# Patient Record
Sex: FEMALE | Race: ASIAN | NOT HISPANIC OR LATINO | Employment: UNEMPLOYED | URBAN - METROPOLITAN AREA
[De-identification: names, ages, dates, MRNs, and addresses within clinical notes are randomized per-mention and may not be internally consistent; named-entity substitution may affect disease eponyms.]

---

## 2023-10-12 ENCOUNTER — OFFICE VISIT (OUTPATIENT)
Dept: FAMILY MEDICINE | Facility: CLINIC | Age: 38
End: 2023-10-12

## 2023-10-12 VITALS
DIASTOLIC BLOOD PRESSURE: 92 MMHG | RESPIRATION RATE: 12 BRPM | OXYGEN SATURATION: 99 % | HEIGHT: 60 IN | TEMPERATURE: 98 F | HEART RATE: 87 BPM | WEIGHT: 125.75 LBS | SYSTOLIC BLOOD PRESSURE: 124 MMHG | BODY MASS INDEX: 24.69 KG/M2

## 2023-10-12 DIAGNOSIS — E05.90 HYPERTHYROIDISM: ICD-10-CM

## 2023-10-12 DIAGNOSIS — I10 PRIMARY HYPERTENSION: ICD-10-CM

## 2023-10-12 DIAGNOSIS — Z30.09 BIRTH CONTROL COUNSELING: Primary | ICD-10-CM

## 2023-10-12 DIAGNOSIS — Z30.017 INSERTION OF IMPLANTABLE SUBDERMAL CONTRACEPTIVE: ICD-10-CM

## 2023-10-12 DIAGNOSIS — E78.5 HYPERLIPIDEMIA LDL GOAL <100: ICD-10-CM

## 2023-10-12 LAB — HCG UR QL: NEGATIVE

## 2023-10-12 PROCEDURE — 81025 URINE PREGNANCY TEST: CPT | Performed by: FAMILY MEDICINE

## 2023-10-12 PROCEDURE — 99204 OFFICE O/P NEW MOD 45 MIN: CPT | Mod: 25 | Performed by: FAMILY MEDICINE

## 2023-10-12 PROCEDURE — 11981 INSERTION DRUG DLVR IMPLANT: CPT | Performed by: FAMILY MEDICINE

## 2023-10-12 RX ORDER — METHIMAZOLE 5 MG/1
5 TABLET ORAL DAILY
Qty: 90 TABLET | Refills: 3 | Status: SHIPPED | OUTPATIENT
Start: 2023-10-12

## 2023-10-12 RX ORDER — ATORVASTATIN CALCIUM 20 MG/1
20 TABLET, FILM COATED ORAL DAILY
Qty: 90 TABLET | Refills: 3 | Status: SHIPPED | OUTPATIENT
Start: 2023-10-12

## 2023-10-12 RX ORDER — AMLODIPINE BESYLATE 5 MG/1
5 TABLET ORAL DAILY
Qty: 90 TABLET | Refills: 3 | Status: SHIPPED | OUTPATIENT
Start: 2023-10-12

## 2023-10-12 RX ORDER — METHIMAZOLE 5 MG/1
5 TABLET ORAL
COMMUNITY
Start: 2023-10-12 | End: 2023-10-12

## 2023-10-12 RX ORDER — AMLODIPINE BESYLATE 5 MG/1
5 TABLET ORAL DAILY
Qty: 90 TABLET | Refills: 0 | Status: SHIPPED | OUTPATIENT
Start: 2023-10-12 | End: 2023-10-12

## 2023-10-12 NOTE — COMMUNITY RESOURCES LIST (ENGLISH)
10/12/2023   [facility]  N/A  For questions about this resource list or additional care needs, please contact your primary care clinic or care manager.  Phone: [phone]   Email: N/A   Address: [address] [place] [address]   Hours: N/A        Financial Stability       Rent and mortgage payment assistance  1  [facility] Distance: 1.29 miles      Phone/Virtual   [address] [place] [address]  Language: English  Hours: Mon - Fri 9:00 AM - 4:00 PM  Fees: Free   Phone: (385) 217-2454 Email: [email or web address] Website: [email or web address]     2  [employer] (HAP) [facility] - Supportive Housing Assistance Program (SHAP) Distance: 1.32 miles      Phone/Virtual   [address] [place] [address]  Language: English, Hmong, Ирина, Macanese  Hours: Mon - Fri 8:30 AM - 5:00 PM  Fees: Free   Phone: (570) 936-5175 Email: lana@[email or web address].org Website: http://www.hmong.org/hap-impact-areas/          Important Numbers & Websites       Emergency Services   911  [facility] Services   311  Poison Control   (343) 706-3259  Suicide Prevention Lifeline   (201) 884-9815 (TALK)  Child Abuse Hotline   (591) 623-7520 (4-A-Child)  Sexual Assault Hotline   (120) 551-8100 (HOPE)  National Runaway Safeline   (817) 507-8221 (RUNAWAY)  All-Options Talkline   (743) 382-9082  Substance Abuse Referral   (128) 506-4833 (HELP)

## 2023-10-12 NOTE — PROGRESS NOTES
Assessment & Plan     Birth control counseling  Long discussion about cost, she currently does not have insurance because [place]. Discussed options like planned parenthood, injections, ocps which are more affordable options. She expressed understanding of out of pocket cost. Also discussed with assistance from [facility]. Risk vs benefit discussed and they opted to get nexplanon done for long term contraception.   - HCG qualitative urine    Primary hypertension  Refilled. DBP elevated, patient has not had her medications for over a day.   - amLODIPine (NORVASC) 5 MG tablet  Dispense: 90 tablet; Refill: 3    Hyperthyroidism  Patient came from [facility], currently on carbimazole (carbimazole metabolizes to methimazole, recommend patient have her TSH checked after changing medications, she declined due to insurance)  - methimazole (TAPAZOLE) 5 MG tablet  Dispense: 90 tablet; Refill: 3    Hyperlipidemia LDL goal <100  - atorvastatin (LIPITOR) 20 MG tablet  Dispense: 90 tablet; Refill: 3    Insertion of implantable subdermal contraceptive  Nexplanon Insertion:  Is a pregnancy test required: Yes.  Was it positive or negative?  Negative  Was a consent obtained?  Yes  Given 's handouts, including when to have Nexplanon removed, list of danger s/sx, side effects and follow up recommended. Encouraged condom use for prevention of STD. Back up contraception advised for 7 days. Advised to call for any fever, for prolonged or severe pain or bleeding, abnormal vaginal dischage. She was advised to use pain medications (ibuprofen) as needed for mild to moderate pain.   - etonogestrel (NEXPLANON) subdermal implant 68 mg  - INSERTION NON-BIODEGRADABLE DRUG DELIVERY IMPLANT        Juan Antonio Washington MD  [facility] JE Aly is a 37 year old, presenting for the following health issues:  Contraception      History of Present Illness       Reason for visit:  Birth  "control    She eats 0-1 servings of fruits and vegetables daily.She consumes 1 sweetened beverage(s) daily.She exercises with enough effort to increase her heart rate 9 or less minutes per day.  She exercises with enough effort to increase her heart rate 3 or less days per week.   She is taking medications regularly.     Patient takes amlodipine, unsure what dose. Took it yesterday but not tday.   She is also on carbimazole for her thyroid, but unsure what dose.     Birth control:   Interested in something long term. Discussed options:   Declined IUD.   Interested in nexplanon, patch, and nuvaring depending on cost.   She would be open to depo as well.       Sheeba Otero is a 37 year old No obstetric history on file. presents for Nexplanon.  Patient has been given the opportunity to ask questions about all forms of birth control, including all options appropriate for Sheeba Otero. Discussed that no method of birth control, except abstinence is 100% effective against pregnancy or sexually transmitted infection.   Sheeba Otero understands she may have the Nexplanon removed at any time and it should be removed by a health care provider.  The entire insertion procedure was reviewed with the patient, including care after placement.    Review of Systems   Constitutional, HEENT, cardiovascular, pulmonary, gi and gu systems are negative, except as otherwise noted.      Objective    BP (!) 124/92   Pulse 87   Temp 98  F (36.7  C) (Oral)   Resp 12   Ht 1.535 m (5' 0.43\")   Wt 57 kg (125 lb 12 oz)   LMP 10/01/2023 (Exact Date)   SpO2 99%   Breastfeeding No   BMI 24.21 kg/m    Body mass index is 24.21 kg/m .  Physical Exam   GENERAL: healthy, alert and no distress  NECK: no adenopathy, no asymmetry, masses, or scars and thyroid normal to palpation  RESP: lungs clear to auscultation - no rales, rhonchi or wheezes  CV: regular rate and rhythm, normal S1 S2, no S3 or S4, no murmur, click or rub, no peripheral " edema and peripheral pulses strong  ABDOMEN: soft, nontender, no hepatosplenomegaly, no masses and bowel sounds normal  MS: no gross musculoskeletal defects noted, no edema    Results for orders placed or performed in visit on 10/12/23   HCG qualitative urine     Status: Normal   Result Value Ref Range    hCG Urine Qualitative Negative Negative     No results found for this or any previous visit (from the past 24 hour(s)).              PROCEDURE NOTE: -- Nexplanon Insertion    Reason for Insertion: contraception    Patient was placed supine with left arm exposed.  Jemal was made 8-10 cm above medial epicondyle and a guiding jemal 4 cm above the first.  Arm was prepped with Betadine. Insertion point was anesthetized with 5 mL 1% lidocaine. After stretching the skin with thumb and index finger around the insertion site, skin punctured with the tip of the needle inserted at 30 degrees and then lowered to horizontal position. The needle was then advanced to its full length. Applicator was then stabilized and slider was unlocked. Slider was pulled back until it stopped and then removed.    Correct placement of the implant was confirmed by palpation in the patient's arm and visualizing the purple top of the obturator.   Bandage and pressure dressing applied to insertion site.    EBL: minimal    Complications: none

## 2023-10-12 NOTE — COMMUNITY RESOURCES LIST (ENGLISH)
10/12/2023   [facility]  N/A  For additional resource needs, please contact your health insurance member services or your primary care team.  Phone: [phone]   Email: N/A   Address: [address] [place] [address]   Hours: N/A        Financial Stability       Rent and mortgage payment assistance  1  [facility] Distance: 1.29 miles      Phone/Virtual   [address] [place] [address]  Language: English  Hours: Mon - Fri 9:00 AM - 4:00 PM  Fees: Free   Phone: (529) 669-8833 Email: [email or web address] Website: [email or web address]/[facility]/     2  [employer] (HAP) [facility] - Supportive Housing Assistance Program (SHAP) Distance: 1.32 miles      Phone/Virtual   [address] [place] [address]  Language: English, Hmong, Mirtha Gifford  Hours: Mon - Fri 8:30 AM - 5:00 PM  Fees: Free   Phone: (629) 529-3757 Email: lana@[email or web address] Website: http://www.ong.org/hap-impact-areas/          Important Numbers & Websites       [address].org  Poison Control   (181) 978-1545 [place]  Suicide and Crisis Lifeline   988 98[facility].org  Childhelp [place] Child Abuse Hotline   809.665.7144 Childhelphotline.org  National Sexual Assault Hotline   (871) 812-1718 (HOPE) Rainn.org  National Runaway Safeline   (757) 229-9437 (RUNAWAY) [facility].org  Pregnancy & Postpartum Support [place]   Call/text 777-322-2820 Ppsupportmn.org  Substance Abuse National Helpline ([facility]   169-395-HELP (8192) Findtreatment.gov  Emergency Services   911

## 2024-12-11 ENCOUNTER — OFFICE VISIT (OUTPATIENT)
Dept: URGENT CARE | Facility: URGENT CARE | Age: 39
End: 2024-12-11

## 2024-12-11 VITALS
RESPIRATION RATE: 20 BRPM | HEART RATE: 65 BPM | OXYGEN SATURATION: 100 % | DIASTOLIC BLOOD PRESSURE: 91 MMHG | SYSTOLIC BLOOD PRESSURE: 130 MMHG | TEMPERATURE: 98 F

## 2024-12-11 DIAGNOSIS — B34.9 VIRAL SYNDROME: Primary | ICD-10-CM

## 2024-12-11 DIAGNOSIS — Z11.52 ENCOUNTER FOR SCREENING FOR COVID-19: ICD-10-CM

## 2024-12-11 DIAGNOSIS — R05.1 ACUTE COUGH: ICD-10-CM

## 2024-12-11 LAB
DEPRECATED S PYO AG THROAT QL EIA: NEGATIVE
FLUAV AG SPEC QL IA: NEGATIVE
FLUBV AG SPEC QL IA: NEGATIVE
GROUP A STREP BY PCR: NOT DETECTED
SARS-COV-2 RNA RESP QL NAA+PROBE: NEGATIVE

## 2024-12-11 PROCEDURE — 87651 STREP A DNA AMP PROBE: CPT | Performed by: PHYSICIAN ASSISTANT

## 2024-12-11 PROCEDURE — 87804 INFLUENZA ASSAY W/OPTIC: CPT | Performed by: PHYSICIAN ASSISTANT

## 2024-12-11 PROCEDURE — 87635 SARS-COV-2 COVID-19 AMP PRB: CPT | Performed by: PHYSICIAN ASSISTANT

## 2024-12-11 PROCEDURE — 99214 OFFICE O/P EST MOD 30 MIN: CPT | Performed by: PHYSICIAN ASSISTANT

## 2024-12-11 RX ORDER — ALBUTEROL SULFATE 90 UG/1
2 INHALANT RESPIRATORY (INHALATION) EVERY 6 HOURS
Qty: 18 G | Refills: 0 | Status: SHIPPED | OUTPATIENT
Start: 2024-12-11 | End: 2025-01-10

## 2024-12-11 RX ORDER — BENZONATATE 100 MG/1
100 CAPSULE ORAL 3 TIMES DAILY PRN
Qty: 30 CAPSULE | Refills: 0 | Status: SHIPPED | OUTPATIENT
Start: 2024-12-11 | End: 2024-12-21

## 2024-12-11 NOTE — PROGRESS NOTES
Patient presents with:  Cough: Cough X1 mouth   Itchy throat       Clinical Decision Making:  Strep test was obtained and was negative.  Culture is to follow. Influenza is negative. COVID-19 screening test is pending.  Symptomatic care was gone over. Expected course of resolution and indication for return was gone over and questions were answered to patient/parent's satisfaction before discharge.        ICD-10-CM    1. Viral syndrome  B34.9 Streptococcus A Rapid Screen w/Reflex to PCR     Influenza A & B Antigen     Group A Streptococcus PCR Throat Swab     albuterol (PROAIR HFA/PROVENTIL HFA/VENTOLIN HFA) 108 (90 Base) MCG/ACT inhaler     benzonatate (TESSALON) 100 MG capsule      2. Encounter for screening for COVID-19  Z11.52 COVID-19 Virus (Coronavirus) by PCR Nose      3. Acute cough  R05.1 albuterol (PROAIR HFA/PROVENTIL HFA/VENTOLIN HFA) 108 (90 Base) MCG/ACT inhaler     benzonatate (TESSALON) 100 MG capsule          Patient Instructions   You were seen today for acute bronchitis. This is likely due to a viral illness.    Symptom management:  - Get plenty of rest  - Avoid smoking and second hand smoke  - May take tylenol or ibuprofen for fever/discomfort  - Drink plenty of non-caffeinated fluids  - Use nasal steroid spray for sinus congestion  - Albuterol inhaler may be used every 6 hours as needed for chest tightness      Reasons to be seen in the emergency room:  - Develop a fever of 100.4 or higher  - Cough changes, coughing up blood, or become short of breath  - Neck stiffness  - Chest pain  - Severe headache  - Unable to tolerate eating or drinking fluids    Otherwise, if no symptom improvement after 5 days, follow-up with your primary care provider.        HPI:  Sheeba Otero is a 39 year old female who presents today for evaluation of 1 month history of cough and congestion.  Patient denies sore throat fever headache myalgias arthralgias chills night sweats vomiting diarrhea or loss of appetite.   Treatment at home has been over-the-counter cough syrup.  No reported household sick contacts or work contacts for illness.     History obtained from chart review and the patient.    Problem List:  There are no relevant problems documented for this patient.      No past medical history on file.    Social History     Tobacco Use    Smoking status: Never     Passive exposure: Never    Smokeless tobacco: Never   Substance Use Topics    Alcohol use: Not on file       Review of Systems  As above in HPI otherwise negative.    Vitals:    12/11/24 1003   BP: (!) 130/91   Pulse: 65   Resp: 20   Temp: 98  F (36.7  C)   SpO2: 100%       General: Patient is resting comfortably no acute distress is afebrile  HEENT: Head is normocephalic atraumatic   eyes are PERRL EOMI sclera anicteric   TMs are clear bilaterally  Throat is with mild pharyngeal wall erythema and no exudate  No cervical lymphadenopathy present  LUNGS: Clear to auscultation bilaterally normal respiratory effort and excursion  HEART: Regular rate and rhythm  Skin: Without rash non-diaphoretic    Physical Exam      Labs:  Results for orders placed or performed in visit on 12/11/24   Streptococcus A Rapid Screen w/Reflex to PCR     Status: Normal    Specimen: Throat; Swab   Result Value Ref Range    Group A Strep antigen Negative Negative   Influenza A & B Antigen     Status: Normal    Specimen: Nose; Swab   Result Value Ref Range    Influenza A antigen Negative Negative    Influenza B antigen Negative Negative    Narrative    Test results must be correlated with clinical data. If necessary, results should be confirmed by a molecular assay or viral culture.     COVID testing is pending.      At the end of the encounter, I discussed results, diagnosis, medications. Discussed red flags for immediate return to clinic/ER, as well as indications for follow up if no improvement. Patient understood and agreed to plan. Patient was stable for discharge.

## 2025-01-19 ENCOUNTER — HEALTH MAINTENANCE LETTER (OUTPATIENT)
Age: 40
End: 2025-01-19